# Patient Record
Sex: MALE | Race: AMERICAN INDIAN OR ALASKA NATIVE | NOT HISPANIC OR LATINO | URBAN - METROPOLITAN AREA
[De-identification: names, ages, dates, MRNs, and addresses within clinical notes are randomized per-mention and may not be internally consistent; named-entity substitution may affect disease eponyms.]

---

## 2021-09-29 ENCOUNTER — EMERGENCY (EMERGENCY)
Facility: HOSPITAL | Age: 56
LOS: 1 days | Discharge: HOME | End: 2021-09-29
Attending: STUDENT IN AN ORGANIZED HEALTH CARE EDUCATION/TRAINING PROGRAM | Admitting: STUDENT IN AN ORGANIZED HEALTH CARE EDUCATION/TRAINING PROGRAM
Payer: MEDICAID

## 2021-09-29 VITALS
DIASTOLIC BLOOD PRESSURE: 77 MMHG | OXYGEN SATURATION: 100 % | HEART RATE: 97 BPM | RESPIRATION RATE: 18 BRPM | TEMPERATURE: 98 F | SYSTOLIC BLOOD PRESSURE: 148 MMHG

## 2021-09-29 VITALS
DIASTOLIC BLOOD PRESSURE: 95 MMHG | HEART RATE: 101 BPM | TEMPERATURE: 98 F | OXYGEN SATURATION: 98 % | SYSTOLIC BLOOD PRESSURE: 159 MMHG | RESPIRATION RATE: 18 BRPM

## 2021-09-29 LAB
ALBUMIN SERPL ELPH-MCNC: 4.7 G/DL — SIGNIFICANT CHANGE UP (ref 3.3–5)
ALP SERPL-CCNC: 83 U/L — SIGNIFICANT CHANGE UP (ref 40–120)
ALT FLD-CCNC: 101 U/L — HIGH (ref 4–41)
ANION GAP SERPL CALC-SCNC: 16 MMOL/L — HIGH (ref 7–14)
AST SERPL-CCNC: 82 U/L — HIGH (ref 4–40)
BASOPHILS # BLD AUTO: 0.06 K/UL — SIGNIFICANT CHANGE UP (ref 0–0.2)
BASOPHILS NFR BLD AUTO: 0.6 % — SIGNIFICANT CHANGE UP (ref 0–2)
BILIRUB SERPL-MCNC: 0.7 MG/DL — SIGNIFICANT CHANGE UP (ref 0.2–1.2)
BUN SERPL-MCNC: 12 MG/DL — SIGNIFICANT CHANGE UP (ref 7–23)
CALCIUM SERPL-MCNC: 9 MG/DL — SIGNIFICANT CHANGE UP (ref 8.4–10.5)
CHLORIDE SERPL-SCNC: 100 MMOL/L — SIGNIFICANT CHANGE UP (ref 98–107)
CO2 SERPL-SCNC: 22 MMOL/L — SIGNIFICANT CHANGE UP (ref 22–31)
CREAT SERPL-MCNC: 0.61 MG/DL — SIGNIFICANT CHANGE UP (ref 0.5–1.3)
EOSINOPHIL # BLD AUTO: 0.02 K/UL — SIGNIFICANT CHANGE UP (ref 0–0.5)
EOSINOPHIL NFR BLD AUTO: 0.2 % — SIGNIFICANT CHANGE UP (ref 0–6)
GLUCOSE SERPL-MCNC: 328 MG/DL — HIGH (ref 70–99)
HCT VFR BLD CALC: 48.2 % — SIGNIFICANT CHANGE UP (ref 39–50)
HGB BLD-MCNC: 16.5 G/DL — SIGNIFICANT CHANGE UP (ref 13–17)
IANC: 8.59 K/UL — HIGH (ref 1.5–8.5)
IMM GRANULOCYTES NFR BLD AUTO: 0.6 % — SIGNIFICANT CHANGE UP (ref 0–1.5)
LIDOCAIN IGE QN: 40 U/L — SIGNIFICANT CHANGE UP (ref 7–60)
LYMPHOCYTES # BLD AUTO: 0.45 K/UL — LOW (ref 1–3.3)
LYMPHOCYTES # BLD AUTO: 4.5 % — LOW (ref 13–44)
MCHC RBC-ENTMCNC: 29.6 PG — SIGNIFICANT CHANGE UP (ref 27–34)
MCHC RBC-ENTMCNC: 34.2 GM/DL — SIGNIFICANT CHANGE UP (ref 32–36)
MCV RBC AUTO: 86.5 FL — SIGNIFICANT CHANGE UP (ref 80–100)
MONOCYTES # BLD AUTO: 0.76 K/UL — SIGNIFICANT CHANGE UP (ref 0–0.9)
MONOCYTES NFR BLD AUTO: 7.6 % — SIGNIFICANT CHANGE UP (ref 2–14)
NEUTROPHILS # BLD AUTO: 8.59 K/UL — HIGH (ref 1.8–7.4)
NEUTROPHILS NFR BLD AUTO: 86.5 % — HIGH (ref 43–77)
NRBC # BLD: 0 /100 WBCS — SIGNIFICANT CHANGE UP
NRBC # FLD: 0 K/UL — SIGNIFICANT CHANGE UP
PLATELET # BLD AUTO: 175 K/UL — SIGNIFICANT CHANGE UP (ref 150–400)
POTASSIUM SERPL-MCNC: 4.5 MMOL/L — SIGNIFICANT CHANGE UP (ref 3.5–5.3)
POTASSIUM SERPL-SCNC: 4.5 MMOL/L — SIGNIFICANT CHANGE UP (ref 3.5–5.3)
PROT SERPL-MCNC: 7.6 G/DL — SIGNIFICANT CHANGE UP (ref 6–8.3)
RBC # BLD: 5.57 M/UL — SIGNIFICANT CHANGE UP (ref 4.2–5.8)
RBC # FLD: 13.5 % — SIGNIFICANT CHANGE UP (ref 10.3–14.5)
SARS-COV-2 RNA SPEC QL NAA+PROBE: SIGNIFICANT CHANGE UP
SODIUM SERPL-SCNC: 138 MMOL/L — SIGNIFICANT CHANGE UP (ref 135–145)
TROPONIN T, HIGH SENSITIVITY RESULT: 9 NG/L — SIGNIFICANT CHANGE UP
WBC # BLD: 9.94 K/UL — SIGNIFICANT CHANGE UP (ref 3.8–10.5)
WBC # FLD AUTO: 9.94 K/UL — SIGNIFICANT CHANGE UP (ref 3.8–10.5)

## 2021-09-29 PROCEDURE — 99285 EMERGENCY DEPT VISIT HI MDM: CPT | Mod: 25

## 2021-09-29 PROCEDURE — 71046 X-RAY EXAM CHEST 2 VIEWS: CPT | Mod: 26

## 2021-09-29 PROCEDURE — 93010 ELECTROCARDIOGRAM REPORT: CPT

## 2021-09-29 RX ORDER — SODIUM CHLORIDE 9 MG/ML
1000 INJECTION, SOLUTION INTRAVENOUS ONCE
Refills: 0 | Status: COMPLETED | OUTPATIENT
Start: 2021-09-29 | End: 2021-09-29

## 2021-09-29 RX ORDER — DIAZEPAM 5 MG
5 TABLET ORAL ONCE
Refills: 0 | Status: DISCONTINUED | OUTPATIENT
Start: 2021-09-29 | End: 2021-09-29

## 2021-09-29 RX ORDER — LIDOCAINE 4 G/100G
10 CREAM TOPICAL ONCE
Refills: 0 | Status: COMPLETED | OUTPATIENT
Start: 2021-09-29 | End: 2021-09-29

## 2021-09-29 RX ORDER — FAMOTIDINE 10 MG/ML
20 INJECTION INTRAVENOUS ONCE
Refills: 0 | Status: COMPLETED | OUTPATIENT
Start: 2021-09-29 | End: 2021-09-29

## 2021-09-29 RX ADMIN — Medication 30 MILLILITER(S): at 17:17

## 2021-09-29 RX ADMIN — Medication 5 MILLIGRAM(S): at 19:34

## 2021-09-29 RX ADMIN — FAMOTIDINE 20 MILLIGRAM(S): 10 INJECTION INTRAVENOUS at 17:17

## 2021-09-29 RX ADMIN — LIDOCAINE 10 MILLILITER(S): 4 CREAM TOPICAL at 17:17

## 2021-09-29 RX ADMIN — SODIUM CHLORIDE 1000 MILLILITER(S): 9 INJECTION, SOLUTION INTRAVENOUS at 17:17

## 2021-09-29 NOTE — ED ADULT NURSE NOTE - CHIEF COMPLAINT QUOTE
upper ab pain x 1 week which has intensified this morning. PT denies any N/V/D. PMH HTN and DM F/S 318 in triage

## 2021-09-29 NOTE — ED ADULT NURSE NOTE - NSIMPLEMENTINTERV_GEN_ALL_ED
Implemented All Universal Safety Interventions:  [place] to call system. Call bell, personal items and telephone within reach. Instruct patient to call for assistance. Room bathroom lighting operational. Non-slip footwear when patient is off stretcher. Physically safe environment: no spills, clutter or unnecessary equipment. Stretcher in lowest position, wheels locked, appropriate side rails in place.

## 2021-09-29 NOTE — ED PROVIDER NOTE - PATIENT PORTAL LINK FT
You can access the FollowMyHealth Patient Portal offered by [facility] by registering at the following website: [email or web address]. By joining [employer]’s FollowMyHealth portal, you will also be able to view your health information using other applications (apps) compatible with our system.

## 2021-09-29 NOTE — ED PROVIDER NOTE - NSFOLLOWUPCLINICS_GEN_ALL_ED_FT
[facility]  Gastroenterology  [address] [place] [address] [address]  [place] [address]  Phone: [phone]  Fax:

## 2021-09-29 NOTE — ED PROVIDER NOTE - ATTENDING CONTRIBUTION TO CARE
55 yo male with PMH DM, HTN, chronic EtOH use presents to ED for evaluation of upper abd pain x years, acute on chronic pain since earlier this morning. Denies chest pain or shortness of breath.   Gen: no acute distress, well appearing, awake, alert and oriented x 3  Cardiac: regular rate and rhythm, +S1S2  Pulm: Clear to auscultation bilaterally  Abd: soft, nontender, nondistended, no guarding  A/P Labs, imaging, medicate, reassess

## 2021-09-29 NOTE — ED PROVIDER NOTE - EKG ADDITIONAL INFORMATION FREE TEXT
NSR. normal axis. borderline criteria for FLAVIO, otherwise normal p wave interval and morphology. QRS interval and morphology unremarkable. Possible e/o of inferior infarct, age undetermined. Non-specific ST and T segment abnormalities, no prior study to compare to.

## 2021-09-29 NOTE — ED ADULT NURSE NOTE - OBJECTIVE STATEMENT
Received Pt in room 22. pt A&OX3, ambulatory. pt with hx of Received Pt in room 22. pt A&OX3, ambulatory. pt with hx HTN, DM. pt c/o intermittent generalized abdominal pain x1 week, worsening this morning. pt appears to be in no distress. vitally stable. respirations are equal and nonlabored, no respiratory distress noted. abdomen soft, slightly tender to touch in upper quadrants, no distention noted. denies any chest pain, sob, cough, fever/chills, headache, dizziness, n/v/d, constipation. 20 gauge iv placed in the left arm labs sent. pt stable, safety maintained. awaiting further plan. will reassess and monitor.

## 2021-09-29 NOTE — ED PROVIDER NOTE - OBJECTIVE STATEMENT
This is a 55 yo M w/ a PMHX of DM, HTN and chronic EtOH use who presents w/ abdominal pain .   Location: Upper midline   Onset: The patient has had these pains on and off for years. His most recent episode of pain starte  Severity:  Duration:  Timing:   Setting:  Aggregating factors:  Alleviating factors:  Associated sympotms:  No hx of abdominal/pelvic surgery. No hx of immunodeficiency. No hx of DM. No hx of IBD. No hx of chronic EtOH use or cannabis use. Current Rx = ***. Known Allergies = ***. This is a 57 yo M w/ a PMHX of DM, HTN and chronic EtOH use (2-3 shots of hard liquor per day) who presents w/ abdominal pain .   Location: Upper midline   Onset: The patient has had these pains on and off for years. His most recent episode of pain started this AM. it has been ongoing since then.   Setting: No remarkable events preceding onset of symptoms.   Alleviating factors: Ameliorated w/ Pepcid  Associated symptoms: No associated fevers, chills, weakness, CP, sob, n/v, melena, dysuria, or rash  Last BM = yesterday  Bowel resection 5 years ago, unknown why he has had this. Current Rx = Losartan and glyburide. Known Allergies = no known allergies to medications.

## 2021-09-29 NOTE — ED PROVIDER NOTE - CLINICAL SUMMARY MEDICAL DECISION MAKING FREE TEXT BOX
57 yo male presents w/ abdominal pain. VSS. Abdominal exam non-surgical. hx of regular etoh use, possible acute on chronic etoh pancreatitis vs gastritis, hepatitis. low suspicion for acute cholecystitis, cholangitis, choledocholithiasis. R/out pnx, covid and ACS. Plan = troponin, e-, cbc, covid, cxr, ekg. Tx w/ GI cocktail and IVF.

## 2021-09-29 NOTE — ED ADULT TRIAGE NOTE - CHIEF COMPLAINT QUOTE
upper ab pain x 1 week which has intensified this morning. PT denies any N/V/D. PMH HTN and DM upper ab pain x 1 week which has intensified this morning. PT denies any N/V/D. PMH HTN and DM F/S 318 in triage

## 2021-09-29 NOTE — ED PROVIDER NOTE - PHYSICAL EXAMINATION
Gen: NAD, non-toxic appearing  Head: normal appearing  HEENT: normal conjunctiva, oral mucosa moist  Lung: no respiratory distress, speaking in full sentences, CTA b/l     CV: regular rate and rhythm, no murmurs  Abd: upper midline abdominal tenderness > RUQ and LUQ tenderness. Normal bs. no peritoneal signs. negative castle's sign.   MSK: no visible deformities  Neuro: No focal deficits, alert and grossly oriented  Skin: Warm  Psych: normal affect

## 2021-09-29 NOTE — ED PROVIDER NOTE - NS ED ROS FT
GENERAL: No fever, no chills  EYES: no neck pain  HEENT: no neck pain  CARDIAC: no chest pain  PULMONARY: no cough, no SOB  GI: positive for abdominal pain, see hpi  : no dysuria  SKIN: no rashes  NEURO: no headache   MSK: no new joint pain

## 2021-09-29 NOTE — ED PROVIDER NOTE - PROGRESS NOTE DETAILS
Wisam Fermin M.D. (PGY-1): Patient without pain since receiving GI cocktail. Troponin 10, sent repeat. EKG showed ST segment abnormality and possible e/o of inferior infarct age undetermined. No prior study to compare. suspect gastritis in a pt w/ abdominal pain resolved with pepcid in the past and resolved s/p GI cocktail here. likely discharge of troponin remains stable. plan discussed w/ night team at sign out. Trop stable, will d/c with GI follow-up. Patient informed and aware.

## 2021-09-29 NOTE — ED PROVIDER NOTE - NSFOLLOWUPINSTRUCTIONS_ED_ALL_ED_FT
You were evaluated in the Emergency Department for abdominal pain.  You were evaluated and examined by a physician, and you had labs, chest xray.      Based on your evaluation:     There are no signs of emergency conditions requiring admission to the hospital on today's workup.  Based on the evaluation, a presumptive diagnosis was made, however, further evaluation may be required by your primary care physician or a specialist for a more definitive diagnosis.  Therefore, please follow-up as directed or return to the Emergency Department if your symptoms change or worsen.    We recommend that you:  1. See your primary care physician within the next 72 hours for follow up.  Bring a copy of your discharge paperwork (including any test results) to your doctor.  2. Please follow-up with GI.      *** Return immediately if you have worsening symptoms, increasing abdominal pain, vomiting, or any other new/concerning symptoms. *** You were evaluated in the Emergency Department for abdominal pain.  You were evaluated and examined by a physician, and you had labs, chest xray which were normal.      Based on your evaluation:     There are no signs of emergency conditions requiring admission to the hospital on today's workup.  Based on the evaluation, a presumptive diagnosis was made, however, further evaluation may be required by your primary care physician or a specialist for a more definitive diagnosis.  Therefore, please follow-up as directed or return to the Emergency Department if your symptoms change or worsen.    We recommend that you:  1. See your primary care physician within the next 72 hours for follow up.  Bring a copy of your discharge paperwork (including any test results) to your doctor.  2. Please follow-up with GI.      *** Return immediately if you have worsening symptoms, increasing abdominal pain, vomiting, or any other new/concerning symptoms. ***

## 2024-07-10 ENCOUNTER — APPOINTMENT (OUTPATIENT)
Dept: GASTROENTEROLOGY | Facility: CLINIC | Age: 59
End: 2024-07-10

## 2024-08-20 ENCOUNTER — NON-APPOINTMENT (OUTPATIENT)
Age: 59
End: 2024-08-20

## 2024-08-21 ENCOUNTER — APPOINTMENT (OUTPATIENT)
Dept: GASTROENTEROLOGY | Facility: CLINIC | Age: 59
End: 2024-08-21
Payer: MEDICAID

## 2024-08-21 VITALS
OXYGEN SATURATION: 96 % | TEMPERATURE: 98 F | HEART RATE: 83 BPM | BODY MASS INDEX: 26.53 KG/M2 | SYSTOLIC BLOOD PRESSURE: 156 MMHG | RESPIRATION RATE: 16 BRPM | WEIGHT: 169 LBS | HEIGHT: 67 IN | DIASTOLIC BLOOD PRESSURE: 89 MMHG

## 2024-08-21 DIAGNOSIS — K21.9 GASTRO-ESOPHAGEAL REFLUX DISEASE W/OUT ESOPHAGITIS: ICD-10-CM

## 2024-08-21 DIAGNOSIS — K31.89 OTHER DISEASES OF STOMACH AND DUODENUM: ICD-10-CM

## 2024-08-21 DIAGNOSIS — R10.84 GENERALIZED ABDOMINAL PAIN: ICD-10-CM

## 2024-08-21 DIAGNOSIS — K59.09 OTHER CONSTIPATION: ICD-10-CM

## 2024-08-21 DIAGNOSIS — Z12.11 ENCOUNTER FOR SCREENING FOR MALIGNANT NEOPLASM OF COLON: ICD-10-CM

## 2024-08-21 DIAGNOSIS — D50.9 IRON DEFICIENCY ANEMIA, UNSPECIFIED: ICD-10-CM

## 2024-08-21 DIAGNOSIS — K64.8 OTHER HEMORRHOIDS: ICD-10-CM

## 2024-08-21 DIAGNOSIS — F17.200 NICOTINE DEPENDENCE, UNSPECIFIED, UNCOMPLICATED: ICD-10-CM

## 2024-08-21 DIAGNOSIS — K29.50 UNSPECIFIED CHRONIC GASTRITIS W/OUT BLEEDING: ICD-10-CM

## 2024-08-21 DIAGNOSIS — Z78.9 OTHER SPECIFIED HEALTH STATUS: ICD-10-CM

## 2024-08-21 DIAGNOSIS — Z86.79 PERSONAL HISTORY OF OTHER DISEASES OF THE CIRCULATORY SYSTEM: ICD-10-CM

## 2024-08-21 DIAGNOSIS — Z86.39 PERSONAL HISTORY OF OTHER ENDOCRINE, NUTRITIONAL AND METABOLIC DISEASE: ICD-10-CM

## 2024-08-21 PROBLEM — Z00.00 ENCOUNTER FOR PREVENTIVE HEALTH EXAMINATION: Status: ACTIVE | Noted: 2024-08-21

## 2024-08-21 PROCEDURE — 99204 OFFICE O/P NEW MOD 45 MIN: CPT

## 2024-08-21 RX ORDER — METFORMIN HYDROCHLORIDE 625 MG/1
TABLET ORAL
Refills: 0 | Status: ACTIVE | COMMUNITY

## 2024-08-21 RX ORDER — METOPROLOL TARTRATE 75 MG/1
TABLET, FILM COATED ORAL
Refills: 0 | Status: ACTIVE | COMMUNITY

## 2024-08-21 RX ORDER — SODIUM SULFATE, POTASSIUM SULFATE AND MAGNESIUM SULFATE 1.6; 3.13; 17.5 G/177ML; G/177ML; G/177ML
17.5-3.13-1.6 SOLUTION ORAL
Qty: 2 | Refills: 0 | Status: ACTIVE | COMMUNITY
Start: 2024-08-21 | End: 1900-01-01

## 2024-08-21 NOTE — REVIEW OF SYSTEMS
[As Noted in HPI] : as noted in HPI [Abdominal Pain] : abdominal pain [Constipation] : constipation [Negative] : Heme/Lymph [FreeTextEntry7] : Generalized abdominal pain

## 2024-08-21 NOTE — ASSESSMENT
[FreeTextEntry1] : Referred pt for EGD-Colonoscopy. Will perform capsule endoscopy if EGD-Colonoscopy is unremarkable. Pt expressed understanding and agrees with the plan.  A low acid/reflux diet was discussed in great detail including not smoking, not drinking alcohol, and not consuming foods that irritate the esophagus. It is helpful to eat small meals throughout the day instead of large meals. You should avoid eating before bedtime or lying down after you eat. It can be helpful to raise the head of your bed six inches. Additionally, you should maintain a healthy weight and good posture. The patient was given written material to take home and review.  The causes of constipation were discussed at length. We discussed: Eat three meals each day. Do not skip meals. Gradually increase the amount of FIBER in your diet. Choose more whole grain breads, cereals, and rice. Select more raw fruits and vegetables and eat the peel, if appropriate. Read food labels and look for the "dietary fiber" content of foods. Good sources have 2 grams of fiber or more. Drink six to eight glasses of water each day. Limit highly refined and processed foods.  I spent 45 minutes with the patient as well as reviewing documents prior to and after the office visit. Patient verbalized understanding of all information provided. All questions answered and reviewed.  Robert Brunner, MD

## 2024-08-21 NOTE — PHYSICAL EXAM

## 2024-08-21 NOTE — HISTORY OF PRESENT ILLNESS
[FreeTextEntry1] : Thank you for consult.  Patient is a 57 y/o male with a PMHx of Iron Deficiency Anemia, Chronic Constipation, GERD, Gastritis, Deformed Pylorus, and Internal Hemorrhoids who presents for concern due to Iron Deficiency Anemia shown on his recent bloodwork, referred by his Hematologist, Dr. Kamala Love. Bloodwork performed on 6/25/2024, ordered by Dr. Love showed a decreased iron level of 19 and a decreased Ferritin level of 4. Pt is currently c/o generalized abdominal pain and chronic constipation x 10 years. Pt has a huge midline abdominal surgical scar, but he does not recall which surgery was done, but that it was 10 years ago, when the pain started. Last EGD was 5/17/2024 with Dr. Gigi LAZARO, which showed gastritis, deformed pylorus, and was negative for H. Pylori. Last colonoscopy was 6/9/2023 with Dr. Lazaro, which showed Internal Hemorrhoids.

## 2024-08-21 NOTE — REASON FOR VISIT
[Initial Evaluation] : an initial evaluation [FreeTextEntry1] : Iron Deficiency Anemia, Generalized Abdominal Pain, Chronic Constipation

## 2024-08-26 ENCOUNTER — APPOINTMENT (OUTPATIENT)
Dept: GASTROENTEROLOGY | Facility: AMBULATORY MEDICAL SERVICES | Age: 59
End: 2024-08-26
Payer: MEDICAID

## 2024-08-26 PROCEDURE — 45378 DIAGNOSTIC COLONOSCOPY: CPT | Mod: 33

## 2024-08-26 PROCEDURE — 43239 EGD BIOPSY SINGLE/MULTIPLE: CPT | Mod: 59

## 2024-09-10 DIAGNOSIS — R10.32 LEFT LOWER QUADRANT PAIN: ICD-10-CM

## 2024-09-10 DIAGNOSIS — R10.84 GENERALIZED ABDOMINAL PAIN: ICD-10-CM

## 2024-09-11 ENCOUNTER — RESULT REVIEW (OUTPATIENT)
Age: 59
End: 2024-09-11

## 2024-09-18 ENCOUNTER — APPOINTMENT (OUTPATIENT)
Dept: CT IMAGING | Facility: CLINIC | Age: 59
End: 2024-09-18
Payer: MEDICAID

## 2024-09-18 ENCOUNTER — OUTPATIENT (OUTPATIENT)
Dept: OUTPATIENT SERVICES | Facility: HOSPITAL | Age: 59
LOS: 1 days | End: 2024-09-18
Payer: MEDICAID

## 2024-09-18 DIAGNOSIS — R10.32 LEFT LOWER QUADRANT PAIN: ICD-10-CM

## 2024-09-18 PROCEDURE — 74177 CT ABD & PELVIS W/CONTRAST: CPT

## 2024-09-18 PROCEDURE — 74177 CT ABD & PELVIS W/CONTRAST: CPT | Mod: 26

## 2024-09-30 ENCOUNTER — APPOINTMENT (OUTPATIENT)
Dept: BARIATRICS | Facility: CLINIC | Age: 59
End: 2024-09-30
Payer: MEDICAID

## 2024-09-30 VITALS
DIASTOLIC BLOOD PRESSURE: 76 MMHG | OXYGEN SATURATION: 98 % | TEMPERATURE: 97.9 F | HEIGHT: 67 IN | SYSTOLIC BLOOD PRESSURE: 138 MMHG | WEIGHT: 170 LBS | HEART RATE: 95 BPM | BODY MASS INDEX: 26.68 KG/M2

## 2024-09-30 DIAGNOSIS — Z78.9 OTHER SPECIFIED HEALTH STATUS: ICD-10-CM

## 2024-09-30 DIAGNOSIS — Z87.891 PERSONAL HISTORY OF NICOTINE DEPENDENCE: ICD-10-CM

## 2024-09-30 PROCEDURE — 99204 OFFICE O/P NEW MOD 45 MIN: CPT

## 2024-10-01 NOTE — PLAN
[FreeTextEntry1] : - GI to perform capsule endsocopy - F/U with surgeon pending results  45 minutes spent with patient and coordinating care

## 2024-10-01 NOTE — HISTORY OF PRESENT ILLNESS
[de-identified] : Pt is a 60 y/o man presenting for abdominal pain.  He has had pain for years.  10 years ago he underwent exploratory laparotomy for bowel obstruction.  (unclear what was done in operating room).    His pain comes and goes.  It is bloating in nature.  Meds: Xantac, metformin, losartan PMH: HTN, DM PSH: Ex Lap Allergies: NKDA

## 2024-10-01 NOTE — PHYSICAL EXAM
[de-identified] : NCAT, NAD [de-identified] : Soft, non-tender, non-distended, midline laparotomy

## 2024-10-01 NOTE — HISTORY OF PRESENT ILLNESS
[de-identified] : Pt is a 58 y/o man presenting for abdominal pain.  He has had pain for years.  10 years ago he underwent exploratory laparotomy for bowel obstruction.  (unclear what was done in operating room).    His pain comes and goes.  It is bloating in nature.  Meds: Xantac, metformin, losartan PMH: HTN, DM PSH: Ex Lap Allergies: NKDA

## 2024-10-01 NOTE — PHYSICAL EXAM
[de-identified] : NCAT, NAD [de-identified] : Soft, non-tender, non-distended, midline laparotomy

## 2024-10-08 ENCOUNTER — APPOINTMENT (OUTPATIENT)
Dept: UROLOGY | Facility: CLINIC | Age: 59
End: 2024-10-08

## 2025-02-19 ENCOUNTER — APPOINTMENT (OUTPATIENT)
Dept: GASTROENTEROLOGY | Facility: CLINIC | Age: 60
End: 2025-02-19
Payer: MEDICAID

## 2025-02-19 VITALS
WEIGHT: 162 LBS | BODY MASS INDEX: 25.43 KG/M2 | OXYGEN SATURATION: 98 % | HEART RATE: 84 BPM | SYSTOLIC BLOOD PRESSURE: 122 MMHG | DIASTOLIC BLOOD PRESSURE: 68 MMHG | HEIGHT: 67 IN

## 2025-02-19 DIAGNOSIS — Z78.9 OTHER SPECIFIED HEALTH STATUS: ICD-10-CM

## 2025-02-19 DIAGNOSIS — R10.84 GENERALIZED ABDOMINAL PAIN: ICD-10-CM

## 2025-02-19 DIAGNOSIS — D50.9 IRON DEFICIENCY ANEMIA, UNSPECIFIED: ICD-10-CM

## 2025-02-19 PROCEDURE — 99214 OFFICE O/P EST MOD 30 MIN: CPT

## 2025-02-20 ENCOUNTER — NON-APPOINTMENT (OUTPATIENT)
Age: 60
End: 2025-02-20

## 2025-02-20 DIAGNOSIS — Z87.19 PERSONAL HISTORY OF OTHER DISEASES OF THE DIGESTIVE SYSTEM: ICD-10-CM

## 2025-05-19 ENCOUNTER — APPOINTMENT (OUTPATIENT)
Dept: GASTROENTEROLOGY | Facility: CLINIC | Age: 60
End: 2025-05-19

## 2025-07-21 ENCOUNTER — APPOINTMENT (OUTPATIENT)
Dept: GASTROENTEROLOGY | Facility: CLINIC | Age: 60
End: 2025-07-21
Payer: MEDICAID

## 2025-07-21 VITALS
DIASTOLIC BLOOD PRESSURE: 76 MMHG | HEIGHT: 66 IN | OXYGEN SATURATION: 98 % | RESPIRATION RATE: 16 BRPM | WEIGHT: 160 LBS | HEART RATE: 101 BPM | SYSTOLIC BLOOD PRESSURE: 148 MMHG | BODY MASS INDEX: 25.71 KG/M2

## 2025-07-21 PROCEDURE — 99212 OFFICE O/P EST SF 10 MIN: CPT | Mod: NC

## 2025-07-22 ENCOUNTER — OUTPATIENT (OUTPATIENT)
Dept: OUTPATIENT SERVICES | Facility: HOSPITAL | Age: 60
LOS: 1 days | End: 2025-07-22
Payer: MEDICAID

## 2025-07-22 ENCOUNTER — APPOINTMENT (OUTPATIENT)
Dept: RADIOLOGY | Facility: IMAGING CENTER | Age: 60
End: 2025-07-22
Payer: MEDICAID

## 2025-07-22 DIAGNOSIS — R10.32 LEFT LOWER QUADRANT PAIN: ICD-10-CM

## 2025-07-22 PROCEDURE — 74019 RADEX ABDOMEN 2 VIEWS: CPT

## 2025-07-22 PROCEDURE — 74019 RADEX ABDOMEN 2 VIEWS: CPT | Mod: 26

## 2025-07-25 ENCOUNTER — NON-APPOINTMENT (OUTPATIENT)
Age: 60
End: 2025-07-25

## 2025-08-20 ENCOUNTER — RESULT REVIEW (OUTPATIENT)
Age: 60
End: 2025-08-20

## 2025-08-20 ENCOUNTER — APPOINTMENT (OUTPATIENT)
Dept: MRI IMAGING | Facility: IMAGING CENTER | Age: 60
End: 2025-08-20

## 2025-08-20 ENCOUNTER — OUTPATIENT (OUTPATIENT)
Dept: OUTPATIENT SERVICES | Facility: HOSPITAL | Age: 60
LOS: 1 days | End: 2025-08-20
Payer: MEDICAID

## 2025-08-20 DIAGNOSIS — Z00.8 ENCOUNTER FOR OTHER GENERAL EXAMINATION: ICD-10-CM

## 2025-08-20 DIAGNOSIS — D50.9 IRON DEFICIENCY ANEMIA, UNSPECIFIED: ICD-10-CM

## 2025-08-20 PROCEDURE — 72197 MRI PELVIS W/O & W/DYE: CPT | Mod: 26

## 2025-08-20 PROCEDURE — 72197 MRI PELVIS W/O & W/DYE: CPT

## 2025-08-20 PROCEDURE — 74183 MRI ABD W/O CNTR FLWD CNTR: CPT | Mod: 26

## 2025-08-20 PROCEDURE — A9585: CPT

## 2025-08-20 PROCEDURE — 74183 MRI ABD W/O CNTR FLWD CNTR: CPT

## 2025-08-29 DIAGNOSIS — K56.699 OTHER INTESTINAL OBSTRUCTION UNSPECIFIED AS TO PARTIAL VERSUS COMPLETE OBSTRUCTION: ICD-10-CM
